# Patient Record
Sex: FEMALE | Race: WHITE | NOT HISPANIC OR LATINO | URBAN - METROPOLITAN AREA
[De-identification: names, ages, dates, MRNs, and addresses within clinical notes are randomized per-mention and may not be internally consistent; named-entity substitution may affect disease eponyms.]

---

## 2024-11-20 ENCOUNTER — APPOINTMENT (OUTPATIENT)
Dept: DERMATOLOGY | Facility: CLINIC | Age: 49
End: 2024-11-20
Payer: COMMERCIAL

## 2024-11-20 DIAGNOSIS — D48.5 NEOPLASM OF UNCERTAIN BEHAVIOR OF SKIN: Primary | ICD-10-CM

## 2024-11-20 DIAGNOSIS — D18.01 HEMANGIOMA OF SKIN: ICD-10-CM

## 2024-11-20 DIAGNOSIS — L81.4 LENTIGO: ICD-10-CM

## 2024-11-20 DIAGNOSIS — B07.9 VIRAL WARTS, UNSPECIFIED TYPE: ICD-10-CM

## 2024-11-20 DIAGNOSIS — D22.9 MELANOCYTIC NEVUS, UNSPECIFIED LOCATION: ICD-10-CM

## 2024-11-20 PROCEDURE — 99204 OFFICE O/P NEW MOD 45 MIN: CPT

## 2024-11-20 PROCEDURE — 11102 TANGNTL BX SKIN SINGLE LES: CPT

## 2024-11-20 PROCEDURE — 17110 DESTRUCTION B9 LES UP TO 14: CPT

## 2024-11-20 NOTE — PROGRESS NOTES
Subjective     Sheba Castrejon is a 49 y.o. female who presents for the following: Wart.   Present on the right 2nd finger and left foot. Patient would also like to get a skin check today. No personal or family history of skin cancer. Denies any new, changing, or concerning lesions.       Review of Systems:  No other skin or systemic complaints other than what is documented elsewhere in the note.    The following portions of the chart were reviewed this encounter and updated as appropriate:          Skin Cancer History  No skin cancer on file.      Specialty Problems    None       Objective   Well appearing patient in no apparent distress; mood and affect are within normal limits.    A focused skin examination was performed. All findings within normal limits unless otherwise noted below.    Assessment/Plan   1. Neoplasm of uncertain behavior of skin  Left Mid Back  5 mm irregular tan brown papule               Lesion biopsy  Type of biopsy: tangential    Informed consent: discussed and consent obtained    Timeout: patient name, date of birth, surgical site, and procedure verified    Anesthesia: the lesion was anesthetized in a standard fashion    Anesthetic:  1% lidocaine w/ epinephrine 1-100,000 local infiltration  Instrument used: DermaBlade    Hemostasis achieved with: electrodesiccation    Outcome: patient tolerated procedure well    Post-procedure details: sterile dressing applied and wound care instructions given    Dressing type: bandage and petrolatum      Staff Communication: Dermatology Local Anesthesia: 1 % Lidocaine / Epinephrine - Amount: 0.5 cc    Specimen 1 - Dermatopathology- DERM LAB  Differential Diagnosis: dysplastic nevus vs other   Check Margins Yes/No?:    Comments:    Dermpath Lab: Routine Histopathology (formalin-fixed tissue)    2. Lentigo  Multiple tan- to light brown-colored, round- to oval-shaped, symmetric and uniform-appearing macules and small patches consistent with lentigines  scattered in sun-exposed areas.    Solar Lentigines and photodamage.  The clinically benign-appearing nature of these lesions and their relation to chronic sun exposure were discussed with the patient today and reassurance provided.  The signs and symptoms of skin cancer were reviewed and the patient was advised to practice sun protection and sun avoidance, use daily sunscreen, and perform regular self skin exams.     3. Melanocytic nevus, unspecified location  Scattered on the patient's face, neck, trunk, and extremities, there are multiple small, round- to oval-shaped, brown-pigmented and pink-colored, symmetric, uniform-appearing macules and dome-shaped papules    Clinically benign- appearing nevi - the clinically benign-appearing nature of the patient's nevi was discussed with the patient today.  I emphasized the importance of performing monthly self-skin exams using the ABCDs of monitoring moles, which were reviewed with the patient today.  I also emphasized the importance of sun avoidance and sun protection with daily sunscreen use.      4. Hemangioma of skin  Scattered on the patient's trunk and extremities, there are multiple small, round, cherry red- to purplish-colored, symmetric, uniform, vascular-appearing macules and papules    Cherry Angiomas - the benign nature of these vascular lesions was discussed with the patient today and reassurance provided.  No treatment is medically indicated for these lesions at this time.    5. Viral warts, unspecified type (4)  Left 1st-2nd Toe Web, Left 2nd Hyponychium of Toe, Right 2nd Finger Medial and Lateral Paronychium (2)  Verrucous papules and plaques    -Discussed nature of condition  -Multiple treatments in office are often needed  -Diligent at home therapy is often needed. Recommend treating at home with 40% salicylic acid. Handout provided at today's visit.   -Cryotherapy today  -Possible side effects of liquid nitrogen treatment reviewed including formation of  blisters, crusting, tenderness, scar, and discoloration which may be permanent.  -Patient advised to return the office for re-evaluation if the treated lesion(s) do not resolve within 4-6 weeks. Patient verbalizes understanding.    Destr of lesion - Left 1st-2nd Toe Web, Left 2nd Hyponychium of Toe, Right 2nd Finger Medial and Lateral Paronychium (2)  Complexity: simple    Destruction method: cryotherapy    Informed consent: discussed and consent obtained    Lesion destroyed using liquid nitrogen: Yes    Cryotherapy cycles:  2  Outcome: patient tolerated procedure well with no complications    Post-procedure details: wound care instructions given        Follow up in 4 weeks.

## 2024-11-22 LAB
LABORATORY COMMENT REPORT: NORMAL
PATH REPORT.FINAL DX SPEC: NORMAL
PATH REPORT.GROSS SPEC: NORMAL
PATH REPORT.RELEVANT HX SPEC: NORMAL
PATH REPORT.TOTAL CANCER: NORMAL

## 2024-12-18 ENCOUNTER — APPOINTMENT (OUTPATIENT)
Dept: DERMATOLOGY | Facility: CLINIC | Age: 49
End: 2024-12-18
Payer: COMMERCIAL

## 2024-12-18 DIAGNOSIS — B07.9 VIRAL WARTS, UNSPECIFIED TYPE: Primary | ICD-10-CM

## 2024-12-18 PROCEDURE — 17110 DESTRUCTION B9 LES UP TO 14: CPT

## 2024-12-18 NOTE — PROGRESS NOTES
Subjective     Sheba Castrejon is a 49 y.o. female who presents for the following: Wart.   Liquid nitrogen done at last visit on 11/20/2024. Currently using salicylic acid 40% daily. Reports an improvement in the warts.    Review of Systems:  No other skin or systemic complaints other than what is documented elsewhere in the note.    The following portions of the chart were reviewed this encounter and updated as appropriate:          Skin Cancer History  No skin cancer on file.      Specialty Problems    None       Objective   Well appearing patient in no apparent distress; mood and affect are within normal limits.    A focused skin examination was performed. All findings within normal limits unless otherwise noted below.    Assessment/Plan   1. Viral warts, unspecified type (4)  Left 1st-2nd Toe Web, Left 2nd Hyponychium of Toe, Right 2nd Finger Lateral Paronychium, Right 2nd Finger Medial Paronychium  Verrucous papule(s)/plaque(s)    -Discussed nature of condition  -Multiple treatments in office are often needed  -Diligent at home therapy is often needed  -Cryotherapy today  -Possible side effects of liquid nitrogen treatment reviewed including formation of blisters, crusting, tenderness, scar, and discoloration which may be permanent.  -Patient advised to return the office for re-evaluation if the treated lesion(s) do not resolve within 4-6 weeks. Patient verbalizes understanding.    Destr of lesion - Left 1st-2nd Toe Web, Left 2nd Hyponychium of Toe, Right 2nd Finger Lateral Paronychium, Right 2nd Finger Medial Paronychium  Complexity: simple    Destruction method: cryotherapy    Informed consent: discussed and consent obtained    Lesion destroyed using liquid nitrogen: Yes    Cryotherapy cycles:  2  Outcome: patient tolerated procedure well with no complications    Post-procedure details: wound care instructions given        Follow up in 4-6 weeks.

## 2025-01-29 ENCOUNTER — APPOINTMENT (OUTPATIENT)
Dept: DERMATOLOGY | Facility: CLINIC | Age: 50
End: 2025-01-29
Payer: COMMERCIAL

## 2025-02-05 ENCOUNTER — APPOINTMENT (OUTPATIENT)
Dept: DERMATOLOGY | Facility: CLINIC | Age: 50
End: 2025-02-05
Payer: COMMERCIAL

## 2025-02-05 DIAGNOSIS — B07.9 VIRAL WARTS, UNSPECIFIED TYPE: Primary | ICD-10-CM

## 2025-02-05 PROCEDURE — 1036F TOBACCO NON-USER: CPT

## 2025-02-05 PROCEDURE — 17110 DESTRUCTION B9 LES UP TO 14: CPT

## 2025-02-05 NOTE — PROGRESS NOTES
Subjective     Sheba Castrejon is a 49 y.o. female who presents for the following: Wart (Fingers,, left big toe). Patient is here today for wart follow up. Treated with liquid nitrogen at her last visit on 12/18/2024. She is treating at home with 40% salicylic acid daily.     Review of Systems:  No other skin or systemic complaints other than what is documented elsewhere in the note.    The following portions of the chart were reviewed this encounter and updated as appropriate:          Skin Cancer History  No skin cancer on file.      Specialty Problems    None       Objective   Well appearing patient in no apparent distress; mood and affect are within normal limits.    A focused skin examination was performed. All findings within normal limits unless otherwise noted below.    Assessment/Plan   1. Viral warts, unspecified type (4)  Left 1st-2nd Toe Web, Left 2nd Hyponychium of Toe, Right 2nd Finger Lateral Paronychium, Right 2nd Finger Medial Paronychium  Verrucous papules and plaques     -Discussed nature of condition  -Multiple treatments in office are often needed  -Diligent at home therapy is often needed  -Cryotherapy today  -Possible side effects of liquid nitrogen treatment reviewed including formation of blisters, crusting, tenderness, scar, and discoloration which may be permanent.  -Patient advised to return the office for re-evaluation if the treated lesion(s) do not resolve within 4-6 weeks. Patient verbalizes understanding.    Destr of lesion - Left 1st-2nd Toe Web, Left 2nd Hyponychium of Toe, Right 2nd Finger Lateral Paronychium, Right 2nd Finger Medial Paronychium  Complexity: simple    Destruction method: cryotherapy    Informed consent: discussed and consent obtained    Lesion destroyed using liquid nitrogen: Yes    Cryotherapy cycles:  2  Outcome: patient tolerated procedure well with no complications    Post-procedure details: wound care instructions given        Follow up in 4-6 weeks.

## 2025-03-12 ENCOUNTER — APPOINTMENT (OUTPATIENT)
Dept: DERMATOLOGY | Facility: CLINIC | Age: 50
End: 2025-03-12
Payer: COMMERCIAL

## 2025-03-12 DIAGNOSIS — B07.8 COMMON WART: Primary | ICD-10-CM

## 2025-03-12 PROCEDURE — 17110 DESTRUCTION B9 LES UP TO 14: CPT | Performed by: NURSE PRACTITIONER

## 2025-03-12 PROCEDURE — 1036F TOBACCO NON-USER: CPT | Performed by: NURSE PRACTITIONER

## 2025-03-12 NOTE — PROGRESS NOTES
Subjective     Sheba Castrejon is a 50 y.o. female who presents for the following: Wart.     Maybe a little smaller on the foot. Still irritating.     Review of Systems:  No other skin or systemic complaints other than what is documented elsewhere in the note.    The following portions of the chart were reviewed this encounter and updated as appropriate:   Tobacco  Allergies  Meds  Problems  Med Hx  Surg Hx         Skin Cancer History  No skin cancer on file.      Specialty Problems    None       Objective   Well appearing patient in no apparent distress; mood and affect are within normal limits.    A focused skin examination was performed. All findings within normal limits unless otherwise noted below.    Assessment/Plan   1. Common wart (4)  Left 1st-2nd Toe Web, Left 2nd Hyponychium of Toe, Right 2nd Finger Lateral Paronychium, Right 2nd Finger Medial Paronychium  Verrucous papules    -I reviewed the etiology of warts in detail with the patient. Discussed that this is a viral infection of the skin. Warts are difficult to eradicate as they occur in areas of relative immune incompetent skin. Treatments are aimed at creating local irritation to the skin, in order to activate the body's immune system to resolve the viral infection.   -Treatment options discussed with the family including cryotherapy therapies.  -Today elect to do the following:   -Cryotherapy to the involved areas.  Reviewed SE of cryotherapy including pain, blistering, and potential scarring/dyspigmentation.     Hold on using SA for next 1-2 weeks.       Destr of lesion - Left 1st-2nd Toe Web, Left 2nd Hyponychium of Toe, Right 2nd Finger Lateral Paronychium, Right 2nd Finger Medial Paronychium  Complexity: simple    Destruction method: cryotherapy    Informed consent: discussed and consent obtained    Debridement: hyperkeratotic portion removed with sharp debridement    Lesion destroyed using liquid nitrogen: Yes    Cryotherapy cycles:   2  Hemostasis achieved with comment:  Silver nitrate  Outcome: patient tolerated procedure well with no complications    Post-procedure details: wound care instructions given    Additional details:  Covered areas with bandaids.         Return in 4 weeks

## 2025-04-14 ENCOUNTER — APPOINTMENT (OUTPATIENT)
Dept: DERMATOLOGY | Facility: CLINIC | Age: 50
End: 2025-04-14
Payer: COMMERCIAL

## 2025-04-14 DIAGNOSIS — R20.9 DISTURBANCE OF SKIN SENSATION: Primary | ICD-10-CM

## 2025-04-14 DIAGNOSIS — B07.8 COMMON WART: ICD-10-CM

## 2025-04-14 PROCEDURE — 17110 DESTRUCTION B9 LES UP TO 14: CPT | Performed by: NURSE PRACTITIONER

## 2025-04-14 PROCEDURE — 1036F TOBACCO NON-USER: CPT | Performed by: NURSE PRACTITIONER

## 2025-04-14 NOTE — PROGRESS NOTES
Subjective     Sheba Castrejon is a 50 y.o. female who presents for the following: Wart.     Possibly one wart is gone. Less irritating. Does report blood blistering with last treatment.     Review of Systems:  No other skin or systemic complaints other than what is documented elsewhere in the note.    The following portions of the chart were reviewed this encounter and updated as appropriate:   Tobacco  Allergies  Meds  Problems  Med Hx  Surg Hx         Skin Cancer History  No skin cancer on file.      Specialty Problems    None       Objective   Well appearing patient in no apparent distress; mood and affect are within normal limits.    A focused skin examination was performed. All findings within normal limits unless otherwise noted below.    Assessment/Plan   1. Disturbance of skin sensation    2. Common wart (3)  Left 1st-2nd Toe Web, Right 2nd Finger Lateral Paronychium, Right 2nd Finger Medial Paronychium  Verrucous papules.     -I reviewed the etiology of warts in detail with the patient. Discussed that this is a viral infection of the skin. Warts are difficult to eradicate as they occur in areas of relative immune incompetent skin. Treatments are aimed at creating local irritation to the skin, in order to activate the body's immune system to resolve the viral infection.   -Treatment options discussed with the family including cryotherapy therapies.  -Today elect to do the following:   -Cryotherapy to the involved areas.  Reviewed SE of cryotherapy including pain, blistering, and potential scarring/dyspigmentation.     Left foot #2 digit distal aspect favor resolved at this time. Will recheck in 1 month.     Destr of lesion - Left 1st-2nd Toe Web, Right 2nd Finger Lateral Paronychium, Right 2nd Finger Medial Paronychium  Complexity: simple    Destruction method: cryotherapy    Informed consent: discussed and consent obtained    Debridement: hyperkeratotic portion removed with sharp debridement    Lesion  destroyed using liquid nitrogen: Yes    Cryotherapy cycles:  2  Outcome: patient tolerated procedure well with no complications    Post-procedure details: wound care instructions given      Related Procedures  Follow Up In Dermatology - Established Patient  Follow Up In Dermatology - Established Patient        Return in 4 weeks

## 2025-05-21 ENCOUNTER — APPOINTMENT (OUTPATIENT)
Dept: DERMATOLOGY | Facility: CLINIC | Age: 50
End: 2025-05-21
Payer: COMMERCIAL

## 2025-05-21 DIAGNOSIS — B07.8 COMMON WART: ICD-10-CM

## 2025-05-21 PROCEDURE — 99213 OFFICE O/P EST LOW 20 MIN: CPT | Performed by: NURSE PRACTITIONER

## 2025-05-21 PROCEDURE — 1036F TOBACCO NON-USER: CPT | Performed by: NURSE PRACTITIONER

## 2025-05-21 RX ORDER — IMIQUIMOD 12.5 MG/.25G
CREAM TOPICAL
Qty: 12 PACKET | Refills: 3 | Status: SHIPPED | OUTPATIENT
Start: 2025-05-21

## 2025-05-21 NOTE — Clinical Note
This is a 50 y.o. female here for wart follow up. Minimal improvement after 3 months of LN2. Discussed switching to topical therapy alternating 40% SA and imiquimod daily. Patient is interested in proceeding. She will apply 40% SA per wart protocol provided. Imiquimod side effects include skin irritation, pain, blistering, swelling. She will notify me if any issues arise. Advised to apply imiquimod and cover with bandaid at bedtime and remove in the AM. Follow up in 3 months for recheck.

## 2025-05-21 NOTE — PROGRESS NOTES
Subjective     Sheba Castrejon is a 50 y.o. female who presents for the following: Wart.     Review of Systems:  No other skin or systemic complaints other than what is documented elsewhere in the note.    The following portions of the chart were reviewed this encounter and updated as appropriate:   Tobacco  Allergies  Meds  Problems  Med Hx  Surg Hx         Skin Cancer History  Biopsy Log Book  No skin cancers from Specimen Tracking.    Additional History      Specialty Problems    None       Objective   Well appearing patient in no apparent distress; mood and affect are within normal limits.    A focused skin examination was performed . All findings within normal limits unless otherwise noted below.    Assessment/Plan   Skin Exam  1. COMMON WART (3)  Left 1st-2nd Toe Web, Right 2nd Finger Lateral Paronychium, Right 2nd Finger Medial Paronychium  Verrucous papules.   This is a 50 y.o. female here for wart follow up. Minimal improvement after 3 months of LN2. Discussed switching to topical therapy alternating 40% SA and imiquimod daily. Patient is interested in proceeding. She will apply 40% SA per wart protocol provided. Imiquimod side effects include skin irritation, pain, blistering, swelling. She will notify me if any issues arise. Advised to apply imiquimod and cover with bandaid at bedtime and remove in the AM. Follow up in 3 months for recheck.   Related Procedures  Follow Up In Dermatology - Established Patient  Related Medications  imiquimod (Aldara) 5 % cream  Apply to affected areas on the right hand and left foot every other day at bedtime. Clean area with soap and water and pat dry. Then apply imiquimod and cover with bandaid.    Return in 3 months for wart recheck.

## 2025-08-20 ENCOUNTER — APPOINTMENT (OUTPATIENT)
Dept: DERMATOLOGY | Facility: CLINIC | Age: 50
End: 2025-08-20
Payer: COMMERCIAL

## 2025-08-20 DIAGNOSIS — B07.8 COMMON WART: Primary | ICD-10-CM

## 2025-08-20 PROCEDURE — 1036F TOBACCO NON-USER: CPT | Performed by: NURSE PRACTITIONER

## 2025-08-20 PROCEDURE — 99213 OFFICE O/P EST LOW 20 MIN: CPT | Performed by: NURSE PRACTITIONER

## 2025-08-20 RX ORDER — IMIQUIMOD 12.5 MG/.25G
CREAM TOPICAL
Qty: 12 PACKET | Refills: 3 | Status: SHIPPED | OUTPATIENT
Start: 2025-08-20

## 2025-11-18 ENCOUNTER — APPOINTMENT (OUTPATIENT)
Dept: DERMATOLOGY | Facility: CLINIC | Age: 50
End: 2025-11-18
Payer: COMMERCIAL